# Patient Record
Sex: MALE | Race: WHITE | HISPANIC OR LATINO | Employment: FULL TIME | URBAN - METROPOLITAN AREA
[De-identification: names, ages, dates, MRNs, and addresses within clinical notes are randomized per-mention and may not be internally consistent; named-entity substitution may affect disease eponyms.]

---

## 2018-10-15 ENCOUNTER — OFFICE VISIT (OUTPATIENT)
Dept: MEDICAL GROUP | Facility: PHYSICIAN GROUP | Age: 33
End: 2018-10-15
Payer: COMMERCIAL

## 2018-10-15 ENCOUNTER — IMMUNIZATION (OUTPATIENT)
Dept: SOCIAL WORK | Facility: CLINIC | Age: 33
End: 2018-10-15

## 2018-10-15 ENCOUNTER — HOSPITAL ENCOUNTER (OUTPATIENT)
Facility: MEDICAL CENTER | Age: 33
End: 2018-10-15
Attending: FAMILY MEDICINE
Payer: COMMERCIAL

## 2018-10-15 VITALS
SYSTOLIC BLOOD PRESSURE: 114 MMHG | RESPIRATION RATE: 16 BRPM | BODY MASS INDEX: 29.1 KG/M2 | HEART RATE: 90 BPM | TEMPERATURE: 98.8 F | DIASTOLIC BLOOD PRESSURE: 66 MMHG | HEIGHT: 67 IN | WEIGHT: 185.4 LBS | OXYGEN SATURATION: 97 %

## 2018-10-15 DIAGNOSIS — Z71.1 CONCERN ABOUT SEXUALLY TRANSMITTED DISEASE IN MALE WITHOUT DIAGNOSIS: ICD-10-CM

## 2018-10-15 DIAGNOSIS — Z23 NEED FOR VACCINATION: ICD-10-CM

## 2018-10-15 PROCEDURE — 87491 CHLMYD TRACH DNA AMP PROBE: CPT

## 2018-10-15 PROCEDURE — 99000 SPECIMEN HANDLING OFFICE-LAB: CPT | Performed by: FAMILY MEDICINE

## 2018-10-15 PROCEDURE — 87591 N.GONORRHOEAE DNA AMP PROB: CPT

## 2018-10-15 PROCEDURE — 99203 OFFICE O/P NEW LOW 30 MIN: CPT | Performed by: FAMILY MEDICINE

## 2018-10-15 ASSESSMENT — PAIN SCALES - GENERAL: PAINLEVEL: NO PAIN

## 2018-10-15 ASSESSMENT — PATIENT HEALTH QUESTIONNAIRE - PHQ9: CLINICAL INTERPRETATION OF PHQ2 SCORE: 0

## 2018-10-15 NOTE — ASSESSMENT & PLAN NOTE
This is an acute condition. He had  from his ex-wife but they are back together. She was positive for chlamydia and he is concerned he may have contracted it as well.

## 2018-10-15 NOTE — PROGRESS NOTES
"Subjective:     CC:  STD screen    HISTORY OF THE PRESENT ILLNESS: Patient is a 33 y.o. male. This pleasant patient is here today to establish and get an STD screen, see below.    Health Maintenance: Completed      Concern about sexually transmitted disease in male without diagnosis  This is an acute condition. He and his wife had  but recently got back together. She was positive for chlamydia and he is concerned he may have contracted it as well.       Allergies: Patient has no known allergies.    No current [facility]-ordered outpatient prescriptions on file.     No current [facility]-ordered facility-administered medications on file.        History reviewed. No pertinent past medical history.    History reviewed. No pertinent surgical history.    Social History   Substance Use Topics   • Smoking status: Never Smoker   • Smokeless tobacco: Never Used   • Alcohol use 5.4 oz/week     9 Cans of beer per week       Social History     Social History Narrative   • No narrative on file       History reviewed. No pertinent family history.    ROS:   Gen: no fevers/chills  Eyes: no changes in vision  ENT: no sore throat, no hearing loss, no bloody nose  Pulm: no sob  CV: no chest pain  GI: no nausea/vomiting, no diarrhea  : no dysuria  MSk: no myalgias  Skin: no rash  Neuro: no numbness/tingling  Heme/Lymph: no easy bruising        - NOTE: All other systems reviewed and are negative, except as in HPI.      Objective:     Exam: Blood pressure 114/66, pulse 90, temperature 37.1 °C (98.8 °F), temperature source Temporal, resp. rate 16, height 1.702 m (5' 7\"), weight 84.1 kg (185 lb 6.4 oz), SpO2 97 %. Body mass index is 29.04 kg/m².    General: Normal appearing. No distress.  HEENT: Normocephalic. Eyes conjunctiva clear lids without ptosis, pupils equal  and reactive to light accommodation, ears normal shape and contour, canals are clear bilaterally, tympanic membranes are benign, oropharynx is without erythema, edema or " exudates.   Neck: Supple without JVD. Thyroid is not enlarged.   Pulmonary: Clear to ausculation.  Normal effort. No rales, ronchi, or wheezing.  Cardiovascular: Regular rate and rhythm without murmur. Carotid and radial pulses are intact and equal bilaterally.  Abdomen: Soft, nontender, nondistended. Normal bowel sounds. Liver and spleen are not palpable  Neurologic: Grossly nonfocal  Lymph: No cervical or supraclavicular lymph nodes are palpable  Skin: Warm and dry.  No obvious lesions.  Musculoskeletal: Normal gait. No extremity cyanosis, clubbing, or edema.  Psych: Normal mood and affect. Alert and oriented x3. Judgment and insight is normal.      Assessment & Plan:   33 y.o. male with the following -    1. Concern about sexually transmitted disease in male without diagnosis  This is an acute condition. He had recently  from his wife but they reconnected and she was diagnosed with chlamydia. He is here today to be tested and he denies any symptoms. Urine was collected in the office. We will call him with the results.    Return to clinic as needed.    Please note that this dictation was created using voice recognition software. I have made every reasonable attempt to correct obvious errors, but I expect that there are errors of grammar and possibly content that I did not discover before finalizing the note.

## 2018-10-16 LAB
C TRACH DNA SPEC QL NAA+PROBE: POSITIVE
N GONORRHOEA DNA SPEC QL NAA+PROBE: NEGATIVE
SPECIMEN SOURCE: ABNORMAL

## 2018-10-17 RX ORDER — AZITHROMYCIN 500 MG/1
1000 TABLET, FILM COATED ORAL ONCE
Qty: 2 TAB | Refills: 0 | Status: SHIPPED | OUTPATIENT
Start: 2018-10-17 | End: 2018-10-17

## 2018-12-11 PROCEDURE — 90686 IIV4 VACC NO PRSV 0.5 ML IM: CPT | Performed by: REGISTERED NURSE

## 2019-01-31 ENCOUNTER — APPOINTMENT (OUTPATIENT)
Dept: MEDICAL GROUP | Facility: PHYSICIAN GROUP | Age: 34
End: 2019-01-31

## 2019-03-21 ENCOUNTER — HOSPITAL ENCOUNTER (OUTPATIENT)
Facility: MEDICAL CENTER | Age: 34
End: 2019-03-21
Payer: COMMERCIAL

## 2019-03-21 LAB
CHOLEST SERPL-MCNC: 183 MG/DL (ref 100–199)
FASTING STATUS PATIENT QL REPORTED: NORMAL
GLUCOSE SERPL-MCNC: 83 MG/DL (ref 65–99)
HDLC SERPL-MCNC: 54 MG/DL
LDLC SERPL CALC-MCNC: 116 MG/DL
TRIGL SERPL-MCNC: 67 MG/DL (ref 0–149)

## 2019-10-28 ENCOUNTER — IMMUNIZATION (OUTPATIENT)
Dept: SOCIAL WORK | Facility: CLINIC | Age: 34
End: 2019-10-28

## 2019-10-28 DIAGNOSIS — Z23 NEED FOR VACCINATION: ICD-10-CM

## 2019-10-28 PROCEDURE — 90686 IIV4 VACC NO PRSV 0.5 ML IM: CPT | Performed by: REGISTERED NURSE

## 2020-06-01 ENCOUNTER — OFFICE VISIT (OUTPATIENT)
Dept: MEDICAL GROUP | Facility: PHYSICIAN GROUP | Age: 35
End: 2020-06-01
Payer: COMMERCIAL

## 2020-06-01 VITALS
DIASTOLIC BLOOD PRESSURE: 80 MMHG | OXYGEN SATURATION: 99 % | SYSTOLIC BLOOD PRESSURE: 114 MMHG | HEIGHT: 67 IN | WEIGHT: 184 LBS | HEART RATE: 78 BPM | TEMPERATURE: 98.4 F | RESPIRATION RATE: 20 BRPM | BODY MASS INDEX: 28.88 KG/M2

## 2020-06-01 DIAGNOSIS — R21 RASH: Primary | ICD-10-CM

## 2020-06-01 DIAGNOSIS — Z23 NEED FOR VACCINATION: ICD-10-CM

## 2020-06-01 PROCEDURE — 99214 OFFICE O/P EST MOD 30 MIN: CPT | Mod: 25 | Performed by: FAMILY MEDICINE

## 2020-06-01 PROCEDURE — 90715 TDAP VACCINE 7 YRS/> IM: CPT | Performed by: FAMILY MEDICINE

## 2020-06-01 PROCEDURE — 90471 IMMUNIZATION ADMIN: CPT | Performed by: FAMILY MEDICINE

## 2020-06-01 RX ORDER — KETOCONAZOLE 20 MG/G
CREAM TOPICAL
Qty: 15 G | Refills: 0 | Status: SHIPPED | OUTPATIENT
Start: 2020-06-01 | End: 2020-11-23

## 2020-06-01 ASSESSMENT — PATIENT HEALTH QUESTIONNAIRE - PHQ9: CLINICAL INTERPRETATION OF PHQ2 SCORE: 0

## 2020-06-01 NOTE — ASSESSMENT & PLAN NOTE
This is a new condition.  Onset: 1 month  Location: penis  Duration: intermittent  Quality: white discoloration  Modifying factors: only occurs after sex - gets irritated and paler  Associated symptoms: no itching, no pain, no discharge, no penile discharge, no dysuria, no fevers/chills  Home treatments: hydrocortisone - no help    His wife did recently have a yeast infection and he wonders if he contracted a yeast infection.

## 2020-06-01 NOTE — PROGRESS NOTES
"Subjective:     CC: rash    HPI:   Theodore presents today with     Rash  This is a new condition.  Onset: 1 month  Location: penis  Duration: intermittent  Quality: white discoloration  Modifying factors: only occurs after sex - gets irritated and paler  Associated symptoms: no itching, no pain, no discharge, no penile discharge, no dysuria, no fevers/chills  Home treatments: hydrocortisone - no help    His wife did recently have a yeast infection and he wonders if he contracted a yeast infection.      History reviewed. No pertinent past medical history.    Social History     Tobacco Use   • Smoking status: Never Smoker   • Smokeless tobacco: Never Used   Substance Use Topics   • Alcohol use: Yes     Alcohol/week: 5.4 oz     Types: 9 Cans of beer per week   • Drug use: No       Current Outpatient Medications Ordered in Epic   Medication Sig Dispense Refill   • ketoconazole (NIZORAL) 2 % Cream Apply small amount to affected area once daily for 2 weeks 15 g 0     No current Epic-ordered facility-administered medications on file.        Allergies:  Patient has no known allergies.    Health Maintenance: Completed    ROS:  Gen: no fevers/chills  Pulm: no sob  CV: no chest pain    Objective:       Exam:  /80 (BP Location: Left arm, Patient Position: Sitting, BP Cuff Size: Adult)   Pulse 78   Temp 36.9 °C (98.4 °F) (Temporal)   Resp 20   Ht 1.702 m (5' 7\")   Wt 83.5 kg (184 lb)   SpO2 99%   BMI 28.82 kg/m²  Body mass index is 28.82 kg/m².    Gen: Alert and oriented, No apparent distress.  Neck: Neck is supple without lymphadenopathy.  Lungs: Normal effort, CTA bilaterally, no wheezes, rhonchi, or rales  CV: Regular rate and rhythm. No murmurs, rubs, or gallops.  Ext: No clubbing, cyanosis, edema.  Skin: Small patch of slightly paler skin on dorsum at base of penis without erythema, scale, vesicles, or other skin findings.    Assessment & Plan:     35 y.o. male with the following -     1. Rash  This is a new " condition.  For last month he has had this intermittent discoloration at the base of his penis on the dorsum.  He initially states of the white discoloration but in fact she is getting paler.  It only seems to occur after having intercourse that started after his wife developed a yeast infection a month ago.  There is no associated itching, pain, discharge, penile discharge, dysuria, fever/chills.  He tried hydrocortisone without any assistance.  There is no rash on exam today and the location he isolates may be slightly paler compared to the rest of the skin.  Does not appear to be a candidal infection of the skin so it is possible his Malassezia furfur.  -Try ketoconazole 2% cream daily for 2 weeks  -If no improvement, refer to dermatology      Return in about 6 months (around 12/1/2020) for Annual/wellness visit.    Please note that this dictation was created using voice recognition software. I have made every reasonable attempt to correct obvious errors, but I expect that there are errors of grammar and possibly content that I did not discover before finalizing the note.

## 2020-11-19 ENCOUNTER — APPOINTMENT (OUTPATIENT)
Dept: MEDICAL GROUP | Facility: PHYSICIAN GROUP | Age: 35
End: 2020-11-19
Payer: COMMERCIAL

## 2020-11-23 ENCOUNTER — OFFICE VISIT (OUTPATIENT)
Dept: MEDICAL GROUP | Facility: PHYSICIAN GROUP | Age: 35
End: 2020-11-23
Payer: COMMERCIAL

## 2020-11-23 VITALS
HEIGHT: 67 IN | TEMPERATURE: 97.8 F | OXYGEN SATURATION: 97 % | BODY MASS INDEX: 29.47 KG/M2 | WEIGHT: 187.8 LBS | SYSTOLIC BLOOD PRESSURE: 114 MMHG | RESPIRATION RATE: 16 BRPM | HEART RATE: 72 BPM | DIASTOLIC BLOOD PRESSURE: 80 MMHG

## 2020-11-23 DIAGNOSIS — Z00.00 WELLNESS EXAMINATION: Primary | ICD-10-CM

## 2020-11-23 DIAGNOSIS — Z11.59 NEED FOR HEPATITIS C SCREENING TEST: ICD-10-CM

## 2020-11-23 PROCEDURE — 99395 PREV VISIT EST AGE 18-39: CPT | Performed by: FAMILY MEDICINE

## 2020-11-23 NOTE — LETTER
[address]  [facility]  [address]  [address]  [place] [address]     November 23, 2020    Patient: Theodore Cuello   YOB: 1985   Date of Visit: 11/23/2020       To Whom It May Concern:    Theodore Cuello was seen for an annual wellness exam on 11/23/2020. He is cleared to continue working.    Sincerely,     Jane Etienne M.D.

## 2020-11-24 NOTE — PROGRESS NOTES
Subjective:     CC:   Chief Complaint   Patient presents with   • Annual Exam       HPI:   Theodore Cuello is a 35 y.o. male who presents for an annual exam. He is feeling well and has no complaints.    Health Maintenance  Advanced directive: n/a   PT/vit D for falls prevention: n/a   Cholesterol Screening: n/a   Diabetes Screening: n/a   AAA Screening: n/a   Aspirin Use: n/a    Diet: trying to eat healthy  Exercise: regular - construction side work   Substance Abuse: No   Safe in relationship.   Seat belts, bike helmet, gun safety discussed.  Sun protection used.    Cancer screening  Colorectal Cancer Screening: n/a    Lung Cancer Screening: n/a    Prostate Cancer Screening/PSA: n/a     Infectious disease screening/Immunizations  --STI Screening: declined  --Practices safe sex.  --HIV Screening: neg in past   --Hepatitis C Screening: ordered   --Immunizations:    Influenza: completed     HPV:  n/a    Tetanus: due 2030   Shingles: n/a    Pneumococcal : n/a     Other immunizations: n/a     He  has no past medical history on file.  He  has no past surgical history on file.  Family History   Problem Relation Age of Onset   • Hypertension Mother    • Diabetes Father    • Hypertension Father    • No Known Problems Sister    • No Known Problems Brother    • No Known Problems Brother    • No Known Problems Sister    • No Known Problems Sister    • No Known Problems Sister      Social History     Tobacco Use   • Smoking status: Never Smoker   • Smokeless tobacco: Never Used   Substance Use Topics   • Alcohol use: Yes     Alcohol/week: 5.4 oz     Types: 9 Cans of beer per week   • Drug use: No       Patient Active Problem List    Diagnosis Date Noted   • Rash 06/01/2020   • Concern about sexually transmitted disease in male without diagnosis 10/15/2018       No current outpatient medications on file.     No current facility-administered medications for this visit.     (including changes today)  Allergies: Patient has  "no known allergies.    Review of Systems   Constitutional: Negative for fever, chills.   HENT: Negative for congestion.    Eyes: Negative for pain.   Respiratory: Negative for shortness of breath.    Cardiovascular: Negative for leg swelling.   Gastrointestinal: Negative for abdominal pain.   Genitourinary: Negative for hematuria.   Skin: Negative for rash.   Neurological: Negative for focal weakness.   Endo/Heme/Allergies: Does not bruise/bleed easily.   Psychiatric/Behavioral: Negative for depression.  The patient is not nervous/anxious.      Objective:     /80 (BP Location: Right arm, Patient Position: Sitting, BP Cuff Size: Adult)   Pulse 72   Temp 36.6 °C (97.8 °F) (Temporal)   Resp 16   Ht 1.702 m (5' 7\")   Wt 85.2 kg (187 lb 12.8 oz)   SpO2 97%   BMI 29.41 kg/m²   Body mass index is 29.41 kg/m².  Wt Readings from Last 4 Encounters:   11/23/20 85.2 kg (187 lb 12.8 oz)   06/01/20 83.5 kg (184 lb)   10/15/18 84.1 kg (185 lb 6.4 oz)   09/28/15 77.1 kg (170 lb)       Physical Exam:  Constitutional: Well-developed and well-nourished. Not diaphoretic. No distress.   Skin: Skin is warm and dry. No rash noted.  Head: Atraumatic without lesions.  Eyes: Conjunctivae and extraocular motions are normal. Pupils are equal, round, and reactive to light. No scleral icterus.   Ears:  External ears unremarkable. Tympanic membranes clear and intact.  Mouth/Throat: Dentition is good. Tongue normal. Oropharynx is clear and moist. Posterior pharynx without erythema or exudates.  Neck: Supple, trachea midline. Normal range of motion. No thyromegaly present. No lymphadenopathy--cervical or supraclavicular.  Cardiovascular: Regular rate and rhythm, S1 and S2 without murmur, rubs, or gallops.    Lungs: Effort normal. Clear to auscultation throughout. No adventitious sounds. No CVA tenderness.  Abdomen: Soft, non tender, and without distention. Active bowel sounds in all four quadrants. No rebound, guarding, masses or " HSM.  Extremities: No cyanosis, clubbing, erythema, nor edema. Distal pulses intact and symmetric.   Musculoskeletal: All major joints AROM full in all directions without pain.  Neurological: Alert and oriented x 3. DTRs 2+/3 and symmetric. No cranial nerve deficit. 5/5 myotomes. Sensation intact.  Psychiatric:  Behavior, mood, and affect are appropriate.    Assessment and Plan:     1. Wellness examination     2. Need for hepatitis C screening test  HCV Scrn ( [id number] [employer]xLife)       HCM: up to date.  Labs per orders.  Vaccinations per orders.  Counseling about diet, supplements, exercise, skin care and safe sex.    Follow-up: Return in about 1 year (around 2021) for Annual/wellness visit.

## 2022-07-01 ENCOUNTER — TELEPHONE (OUTPATIENT)
Dept: SCHEDULING | Facility: IMAGING CENTER | Age: 37
End: 2022-07-01

## 2025-01-28 ENCOUNTER — OCCUPATIONAL MEDICINE (OUTPATIENT)
Dept: URGENT CARE | Facility: PHYSICIAN GROUP | Age: 40
End: 2025-01-28
Payer: COMMERCIAL

## 2025-01-28 VITALS
DIASTOLIC BLOOD PRESSURE: 84 MMHG | WEIGHT: 199.1 LBS | BODY MASS INDEX: 31.25 KG/M2 | HEART RATE: 63 BPM | SYSTOLIC BLOOD PRESSURE: 122 MMHG | HEIGHT: 67 IN | TEMPERATURE: 97.8 F | OXYGEN SATURATION: 96 % | RESPIRATION RATE: 16 BRPM
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
## 2025-01-28 DIAGNOSIS — Z02.1 PRE-EMPLOYMENT DRUG SCREENING: Primary | ICD-10-CM

## 2025-01-28 DIAGNOSIS — S81.812A LACERATION OF LEFT LOWER EXTREMITY, INITIAL ENCOUNTER: ICD-10-CM

## 2025-01-28 RX ORDER — FINASTERIDE 5 MG/1
5 TABLET, FILM COATED ORAL
COMMUNITY
Start: 2024-12-28

## 2025-01-28 NOTE — LETTER
"    EMPLOYEE’S CLAIM FOR COMPENSATION/ REPORT OF INITIAL TREATMENT  FORM C-4  PLEASE TYPE OR PRINT    EMPLOYEE’S CLAIM - PROVIDE ALL INFORMATION REQUESTED   First Name                    JOCY Jaimes                  Last Name  Khushboo Birthdate                    1985                Sex  Male Claim Number (Insurer’s Use Only)     Home Address  [address] Age  40 y.o. 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 (if applicable)  Rolling Bars    How did this injury or occupational disease occur? (Be specific and answer in detail. Use additional sheet if necessary)  Was rolling little round bars and one came towards me try to move out of the waybut it got me   If you believe that you have an occupational disease, when did you first have knowledge of the disability and its relationship to your employment?  No Witnesses to the Accident (if applicable)  brenna      Nature of Injury or Occupational Disease  Laceration  Part(s) of Body Injured or Affected  Upper Leg (L) N/A N/A    I " CERTIFY THAT THE ABOVE IS TRUE AND CORRECT TO T HE BEST OF MY KNOWLEDGE AND THAT I HAVE PROVIDED THIS INFORMATION IN ORDER TO OBTAIN THE BENEFITS OF [place] OR [place]  I HEREBY AUTHORIZE ANY PHYSICIAN, CHIROPRACTOR, SURGEON, PRACTITIONER OR ANY OTHER PERSON, ANY HOSPITAL, INCLUDING [facility] OR [facility], ANY  MEDICAL SERVICE ORGANIZATION, ANY INSURANCE COMPANY, OR OTHER INSTITUTION OR ORGANIZATION TO RELEASE TO EACH OTHER, ANY MEDICAL OR OTHER INFORMATION, INCLUDING BENEFITS PAID OR PAYABLE, PERTINENT TO THIS INJURY OR DISEASE, EXCEPT INFORMATION RELATIVE TO DIAGNOSIS, TREATMENT AND/OR COUNSELING FOR AIDS, PSYCHOLOGICAL CONDITIONS, ALCOHOL OR CONTROLLED SUBSTANCES, FOR WHICH I MUST GIVE SPECIFIC AUTHORIZATION.  A PHOTOSTAT OF THIS AUTHORIZATION SHALL BE VALID AS THE ORIGINAL.     Date 1/28/2025   Place [facility] Employee’s Original or  *Electronic Signature   THIS REPORT MUST BE COMPLETED AND MAILED WITHIN 3 WORKING DAYS OF TREATMENT   [facility]    Name of Facility  [facility]   Date 1/28/2025 Diagnosis and Description of Injury or Occupational Disease  (S81.812A) Laceration of left lower extremity, initial encounter  (Y99.0) Work related injury  Diagnoses of Laceration of left lower extremity, initial encounter and Work related injury were pertinent to this visit. Is there evidence that the injured employee was under the influence of alcohol and/or another controlled substance at the time of accident?  []No  [] Yes (if yes, please explain)   Hour 3:33 PM  No   Treatment: Laceration repaired as described above including 6 buried and 5 simple interrupted superficially.  Wound was covered with Polysporin and bandage.  Supplies were provided.  Discussed red flags and return precautions with regards to infection.  Okay to use ibuprofen as needed for pain.  No  work restrictions required.  Follow-up in 10 days for suture removal or sooner as needed any questions, concerns or signs of infection.      Have you advised the patient to remain off work five days or more?   [] Yes  [] No        No           If yes, indicate dates: From   To      If no, is the injured employee capable of: [] full duty Yes   [] modified duty    If yes to modified duty, specify any limitations / restrictions:       X-Ray Findings:      From information given by the employee, together with medical evidence, can you directly connect this injury or occupational disease as job incurred?  []Yes   [] No Yes    Is additional medical care by a physician indicated? []Yes [] No  Yes    Do you know of any previous injury or disease contributing to this condition or occupational disease? []Yes [] No (Explain if yes)                          No   Date  1/28/2025 Print Health Care Provider’s Name  Ian Orosco D.O. I certify that the employer’s copy of  this form was delivered to the employer on:   Address  [address] [place] [address]. [id number] INSURER'S USE ONLY                       [facility]  [id number] Provider’s Tax ID Number  [id number]   Telephone  Dept: [phone]    Health Care Provider’s Original or Electronic Signature  e-IAN Magana D.O. Degree (MD,DO, DC,PA-C,APRN)  DO  Choose (if applicable)      ORIGINAL - TREATING HEALTHCARE PROVIDER PAGE 2 - INSURER/TPA PAGE 3 - EMPLOYER PAGE 4 - EMPLOYEE             Form C-4 (rev.08/23)

## 2025-01-29 LAB
BREATH ALCOHOL COMMENT: NORMAL
POC BREATHALIZER: 0 PERCENT (ref 0–0.01)

## 2025-01-31 NOTE — PROGRESS NOTES
"Subjective:     Theodore Cuello is a 40 y.o. male who presents for Work-Related Injury (WC New VDM/Lac on upper L leg/DOI 1/28/25. Pt was rolling bars and one came towards him and accidentally cut his L leg. )  Today's date: 1/28/2025  Date of injury: 1/28/2025  CC: Laceration to left leg    Patient is here with a chief complaint of a laceration he sustained to the medial margins of his thigh, adjacent to the proximal knee which happened approximately 2 to 3 hours ago while at work.  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  He has not taken any medication for symptomatic relief.  Reports minimal pain.  Tetanus was updated in 2020.         PMH:   No pertinent past medical history to this problem  MEDS:  Medications were reviewed in EMR  ALLERGIES:  Allergies were reviewed in EMR  FH:   No pertinent family history to this problem       Objective:     /84 (BP Location: Right arm, Patient Position: Sitting, BP Cuff Size: Adult)   Pulse 63   Temp 36.6 °C (97.8 °F) (Temporal)   Resp 16   Ht 1.702 m (5' 7\")   Wt 90.3 kg (199 lb 1.6 oz)   SpO2 96%   BMI 31.18 kg/m²     Gen: no acute distress, normal voice  Skin: dry, intact, moist mucosal membranes  Head: Atraumatic, normocephalic  Psych: normal affect, normal judgement, alert, awake  Musculoskeletal: Tendon shaped laceration along the medial margins of the thigh, just proximal to the knee without joint extension.  Wound was tracey-shaped measuring approximately 5 cm x 2.5 cm with full extension into the epidermis with exposure of fat/subcutaneous tissue.  No surrounding erythema or edema.  Hemostatic on presentation.    Procedure: Laceration Repair  -Wound was thoroughly cleaned with Hibiclens  -No tendon/nerve/vascular involvement. No contamination  -Clean technique with sterile instruments and sutures used  -Local anesthesia with 2% lidocaine with epinephrine (approximately 9 cc " total)  -Layered closure using 5 sutures deep (4 Vicryl , 1 Monocryl ), followed by 6 superficial using 4-0 Vicryl with good approximation  -Polysporin and dressing placed  -The patient tolerated the procedure well with no immediate complications.  There was nominal blood loss.         Assessment/Plan:       1. Laceration of left lower extremity, initial encounter    2. Work related injury    3. Pre-employment drug screening  - POCT Breath Alcohol Test    Other orders  - finasteride (PROSCAR) 5 MG Tab; Take 5 mg by mouth every day.      FROM   TO            Laceration repaired as described above including 5 buried and 6 simple interrupted superficially.  Wound was covered with Polysporin and bandage.  Supplies were provided.  Discussed red flags and return precautions with regards to infection.  Okay to use ibuprofen as needed for pain.  No work restrictions required.  Follow-up in 10 days for suture removal or sooner as needed any questions, concerns or signs of infection.

## 2025-02-07 ENCOUNTER — TELEPHONE (OUTPATIENT)
Dept: OCCUPATIONAL MEDICINE | Facility: CLINIC | Age: 40
End: 2025-02-07
Payer: COMMERCIAL

## 2025-02-07 NOTE — TELEPHONE ENCOUNTER
Let a voicemail @3:37pm. Let patient know that he did not show up for his appointment and he can call us back to reschedule.